# Patient Record
Sex: MALE | Race: WHITE | NOT HISPANIC OR LATINO | Employment: OTHER | URBAN - METROPOLITAN AREA
[De-identification: names, ages, dates, MRNs, and addresses within clinical notes are randomized per-mention and may not be internally consistent; named-entity substitution may affect disease eponyms.]

---

## 2017-11-13 ENCOUNTER — ESTABLISHED PATIENT (OUTPATIENT)
Dept: URBAN - METROPOLITAN AREA CLINIC 36 | Facility: CLINIC | Age: 82
End: 2017-11-13

## 2017-11-13 DIAGNOSIS — H40.1132: ICD-10-CM

## 2017-11-13 DIAGNOSIS — H57.13: ICD-10-CM

## 2017-11-13 PROCEDURE — 92012 INTRM OPH EXAM EST PATIENT: CPT

## 2017-11-13 PROCEDURE — 1036F TOBACCO NON-USER: CPT

## 2017-11-13 PROCEDURE — G8427 DOCREV CUR MEDS BY ELIG CLIN: HCPCS

## 2017-11-13 ASSESSMENT — TONOMETRY
OS_IOP_MMHG: 12
OD_IOP_MMHG: 12

## 2017-11-13 ASSESSMENT — VISUAL ACUITY
OD_SC: 20/70-2
OS_SC: 20/40

## 2018-01-09 ENCOUNTER — ESTABLISHED PATIENT (OUTPATIENT)
Dept: URBAN - METROPOLITAN AREA CLINIC 36 | Facility: CLINIC | Age: 83
End: 2018-01-09

## 2018-01-09 DIAGNOSIS — H40.1132: ICD-10-CM

## 2018-01-09 PROCEDURE — 92083 EXTENDED VISUAL FIELD XM: CPT

## 2018-01-09 PROCEDURE — G8427 DOCREV CUR MEDS BY ELIG CLIN: HCPCS

## 2018-01-09 PROCEDURE — 1036F TOBACCO NON-USER: CPT

## 2018-01-09 PROCEDURE — 92012 INTRM OPH EXAM EST PATIENT: CPT

## 2018-01-09 PROCEDURE — 0517F GLAUCOMA PLAN OF CARE DOCD: CPT

## 2018-01-09 PROCEDURE — 3285F IOP DOWN <15% OF PRE-SVC LVL: CPT

## 2018-01-09 ASSESSMENT — TONOMETRY
OS_IOP_MMHG: 09
OD_IOP_MMHG: 10

## 2018-01-09 ASSESSMENT — VISUAL ACUITY
OS_CC: 20/40
OD_CC: 20/70

## 2018-04-12 ENCOUNTER — APPOINTMENT (RX ONLY)
Dept: RURAL CLINIC 4 | Facility: CLINIC | Age: 83
Setting detail: DERMATOLOGY
End: 2018-04-12

## 2018-04-12 DIAGNOSIS — L11.1 TRANSIENT ACANTHOLYTIC DERMATOSIS [GROVER]: ICD-10-CM

## 2018-04-12 DIAGNOSIS — L08.9 LOCAL INFECTION OF THE SKIN AND SUBCUTANEOUS TISSUE, UNSPECIFIED: ICD-10-CM

## 2018-04-12 DIAGNOSIS — L57.0 ACTINIC KERATOSIS: ICD-10-CM

## 2018-04-12 PROBLEM — E03.9 HYPOTHYROIDISM, UNSPECIFIED: Status: ACTIVE | Noted: 2018-04-12

## 2018-04-12 PROBLEM — L30.9 DERMATITIS, UNSPECIFIED: Status: ACTIVE | Noted: 2018-04-12

## 2018-04-12 PROBLEM — L85.3 XEROSIS CUTIS: Status: ACTIVE | Noted: 2018-04-12

## 2018-04-12 PROCEDURE — ? TREATMENT REGIMEN

## 2018-04-12 PROCEDURE — 99202 OFFICE O/P NEW SF 15 MIN: CPT | Mod: 25

## 2018-04-12 PROCEDURE — ? LIQUID NITROGEN

## 2018-04-12 PROCEDURE — ? BIOPSY BY SHAVE METHOD

## 2018-04-12 PROCEDURE — ? PRESCRIPTION

## 2018-04-12 PROCEDURE — ? COUNSELING

## 2018-04-12 PROCEDURE — 11100: CPT | Mod: 59

## 2018-04-12 PROCEDURE — 17000 DESTRUCT PREMALG LESION: CPT

## 2018-04-12 PROCEDURE — 11101: CPT

## 2018-04-12 PROCEDURE — ? ORDER TESTS

## 2018-04-12 RX ORDER — MUPIROCIN 20 MG/G
OINTMENT TOPICAL
Qty: 1 | Refills: 3 | Status: ERX | COMMUNITY
Start: 2018-04-12

## 2018-04-12 RX ORDER — TRIAMCINOLONE ACETONIDE 1 MG/G
CREAM TOPICAL BID
Qty: 1 | Refills: 1 | Status: ERX | COMMUNITY
Start: 2018-04-12

## 2018-04-12 RX ADMIN — TRIAMCINOLONE ACETONIDE: 1 CREAM TOPICAL at 14:41

## 2018-04-12 RX ADMIN — MUPIROCIN: 20 OINTMENT TOPICAL at 14:47

## 2018-04-12 ASSESSMENT — LOCATION DETAILED DESCRIPTION DERM
LOCATION DETAILED: RIGHT SUPERIOR UPPER BACK
LOCATION DETAILED: LEFT SUPERIOR UPPER BACK
LOCATION DETAILED: RIGHT FOREHEAD

## 2018-04-12 ASSESSMENT — LOCATION ZONE DERM
LOCATION ZONE: FACE
LOCATION ZONE: TRUNK

## 2018-04-12 ASSESSMENT — LOCATION SIMPLE DESCRIPTION DERM
LOCATION SIMPLE: RIGHT FOREHEAD
LOCATION SIMPLE: RIGHT UPPER BACK
LOCATION SIMPLE: LEFT UPPER BACK

## 2018-04-12 NOTE — PROCEDURE: LIQUID NITROGEN
Number Of Freeze-Thaw Cycles: 3 freeze-thaw cycles
Consent: The patient's consent was obtained including but not limited to risks of crusting, scabbing, blistering, scarring, darker or lighter pigmentary change, recurrence, incomplete removal and infection.
Duration Of Freeze Thaw-Cycle (Seconds): 3
Render Post-Care Instructions In Note?: yes
Post-Care Instructions: I reviewed with the patient in detail post-care instructions. Patient is to wear sunprotection, and avoid picking at any of the treated lesions. Pt may apply Vaseline to crusted or scabbing areas.
Detail Level: Simple
Aperture Size (Optional): C

## 2018-04-12 NOTE — PROCEDURE: TREATMENT REGIMEN
Continue Regimen: Orvan Erp
Initiate Treatment: Aveeno soaks with 1 tsp vinegar QD-BID\\nTriamcinolone cream BID 10/5
Otc Regimen: Aveeno balm
Detail Level: Simple
Samples Given: Cerave hydrating cleanser\\nPlexion cleanser

## 2018-04-12 NOTE — PROCEDURE: ORDER TESTS
Performing Laboratory: [id number]
Expected Date Of Service: 04/12/2018
Billing Type: [employer]
Bill For Surgical Tray: no

## 2018-04-12 NOTE — PROCEDURE: BIOPSY BY SHAVE METHOD
Hemostasis: Drysol and Electrocautery
Billing Type: [employer]
Curettage Text: The wound bed was treated with curettage after the biopsy was performed.
Electrodesiccation And Curettage Text: The wound bed was treated with electrodesiccation and curettage after the biopsy was performed.
Cryotherapy Text: The wound bed was treated with cryotherapy after the biopsy was performed.
Notification Instructions: Patient will be notified of biopsy results. However, patient instructed to call the office if not contacted within 2 weeks.
Was A Bandage Applied: Yes
Lab Facility: [facility]
Biopsy Method: Dermablade
Wound Care: Mupirocin
X Size Of Lesion In Cm: 0
Anesthesia Volume In Cc (Will Not Render If 0): 0.5
Bill For Surgical Tray: no
Detail Level: Simple
Electrodesiccation Text: The wound bed was treated with electrodesiccation after the biopsy was performed.
Post-Care Instructions: I reviewed with the patient in detail post-care instructions. Patient is to keep the biopsy site dry overnight, and then apply bacitracin twice daily until healed. Patient may apply hydrogen peroxide soaks to remove any crusting.
Consent: Written consent was obtained and risks were reviewed including but not limited to scarring, infection, bleeding, scabbing, incomplete removal, nerve damage and allergy to anesthesia.
Silver Nitrate Text: The wound bed was treated with silver nitrate after the biopsy was performed.
Body Location Override (Optional - Billing Will Still Be Based On Selected Body Map Location If Applicable): right scapula
Biopsy Type: H and E
Lab: [facility]
Dressing: pressure dressing with telfa
Anesthesia Type: 1% lidocaine with epinephrine and a 1:10 solution of 8.4% sodium bicarbonate
Type Of Destruction Used: Curettage
Lab: [id number]
Wound Care: Vaseline
Lab Facility: [id number]
Billing Type: Third-Party Bill
Body Location Override (Optional - Billing Will Still Be Based On Selected Body Map Location If Applicable): left scapula

## 2018-04-26 ENCOUNTER — APPOINTMENT (RX ONLY)
Dept: RURAL CLINIC 4 | Facility: CLINIC | Age: 83
Setting detail: DERMATOLOGY
End: 2018-04-26

## 2018-04-26 DIAGNOSIS — L11.1 TRANSIENT ACANTHOLYTIC DERMATOSIS [GROVER]: ICD-10-CM | Status: RESOLVED

## 2018-04-26 DIAGNOSIS — D485 NEOPLASM OF UNCERTAIN BEHAVIOR OF SKIN: ICD-10-CM

## 2018-04-26 PROBLEM — D48.5 NEOPLASM OF UNCERTAIN BEHAVIOR OF SKIN: Status: ACTIVE | Noted: 2018-04-26

## 2018-04-26 PROCEDURE — 99213 OFFICE O/P EST LOW 20 MIN: CPT

## 2018-04-26 PROCEDURE — ? OBSERVATION

## 2018-04-26 PROCEDURE — ? COUNSELING

## 2018-04-26 PROCEDURE — ? TREATMENT REGIMEN

## 2018-04-26 ASSESSMENT — LOCATION SIMPLE DESCRIPTION DERM
LOCATION SIMPLE: LEFT UPPER BACK
LOCATION SIMPLE: ABDOMEN
LOCATION SIMPLE: RIGHT UPPER BACK
LOCATION SIMPLE: LEFT HAND

## 2018-04-26 ASSESSMENT — LOCATION DETAILED DESCRIPTION DERM
LOCATION DETAILED: LEFT ULNAR DORSAL HAND
LOCATION DETAILED: RIGHT SUPERIOR LATERAL UPPER BACK
LOCATION DETAILED: LEFT SUPERIOR UPPER BACK
LOCATION DETAILED: LEFT LATERAL ABDOMEN

## 2018-04-26 ASSESSMENT — LOCATION ZONE DERM
LOCATION ZONE: HAND
LOCATION ZONE: TRUNK

## 2018-04-26 NOTE — PROCEDURE: TREATMENT REGIMEN
Detail Level: Zone
Plan: Patient will apply mupirocin ointment
Continue Regimen: Tac bid x 10/5 with flares.

## 2018-05-22 ENCOUNTER — IOP CHECK (OUTPATIENT)
Dept: URBAN - METROPOLITAN AREA CLINIC 36 | Facility: CLINIC | Age: 83
End: 2018-05-22

## 2018-05-22 DIAGNOSIS — H40.1132: ICD-10-CM

## 2018-05-22 DIAGNOSIS — H57.13: ICD-10-CM

## 2018-05-22 PROCEDURE — 1036F TOBACCO NON-USER: CPT

## 2018-05-22 PROCEDURE — 3284F IOP RED >=15% PRE-NTRV LVL: CPT

## 2018-05-22 PROCEDURE — G8427 DOCREV CUR MEDS BY ELIG CLIN: HCPCS

## 2018-05-22 PROCEDURE — 92133 CPTRZD OPH DX IMG PST SGM ON: CPT

## 2018-05-22 PROCEDURE — 2027F OPTIC NERVE HEAD EVAL DONE: CPT

## 2018-05-22 PROCEDURE — 0517F GLAUCOMA PLAN OF CARE DOCD: CPT

## 2018-05-22 PROCEDURE — 92012 INTRM OPH EXAM EST PATIENT: CPT

## 2018-05-22 ASSESSMENT — TONOMETRY
OD_IOP_MMHG: 12
OS_IOP_MMHG: 12

## 2018-05-22 ASSESSMENT — VISUAL ACUITY
OD_CC: 20/70+2
OS_CC: 20/60+2

## 2019-09-18 ENCOUNTER — EST. PATIENT EMERGENCY (OUTPATIENT)
Dept: URBAN - METROPOLITAN AREA CLINIC 36 | Facility: CLINIC | Age: 84
End: 2019-09-18

## 2019-09-18 DIAGNOSIS — H49.02: ICD-10-CM

## 2019-09-18 PROCEDURE — 92014 COMPRE OPH EXAM EST PT 1/>: CPT

## 2019-09-18 ASSESSMENT — TONOMETRY
OS_IOP_MMHG: 18
OD_IOP_MMHG: 16

## 2019-09-18 ASSESSMENT — VISUAL ACUITY
OS_CC: 20/40-1
OD_CC: 20/60

## 2025-02-07 NOTE — PROCEDURE: COUNSELING
Detail Level: Zone
Detail Level: Detailed
GEN - NAD; well appearing; A+O x3  HEAD -5 cm laceration left eyebrow  EYES - EOMI, no conjunctival pallor, no scleral icterus  ENT -   mucous membranes  moist , no discharge  PULM - CTA b/l,  symmetric breath sounds  COR -  RRR, S1 S2, no murmurs  ABD - ND, NT, soft, no guarding, no rebound, no masses    EXTREMS -no edema, no deformity, warm and well perfused   SKIN - no rash or bruising      NEUROLOGIC - alert, sensation nl, motor 5/5 RUE/LUE/RLE/LLE